# Patient Record
Sex: MALE | Race: WHITE | URBAN - METROPOLITAN AREA
[De-identification: names, ages, dates, MRNs, and addresses within clinical notes are randomized per-mention and may not be internally consistent; named-entity substitution may affect disease eponyms.]

---

## 2018-02-17 LAB
INFLUENZA A ANTIGEN, POC: NEGATIVE
INFLUENZA B ANTIGEN, POC: POSITIVE
RAPID GROUP A STREP (OHS): POSITIVE

## 2019-03-14 LAB — RAPID GROUP A STREP (OHS): NEGATIVE

## 2020-01-30 LAB
ABS NEUT (OLG): 3.7 X10(3)/MCL (ref 2.1–9.2)
BASOPHILS # BLD AUTO: 0.04 X10(3)/MCL (ref 0–0.2)
BASOPHILS NFR BLD AUTO: 0.6 % (ref 0–0.9)
BUN SERPL-MCNC: 13 MG/DL (ref 7–18)
CALCIUM SERPL-MCNC: 9.2 MG/DL (ref 8.5–10.1)
CHLORIDE SERPL-SCNC: 107 MMOL/L (ref 98–107)
CO2 SERPL-SCNC: 25 MMOL/L (ref 21–32)
CREAT SERPL-MCNC: 0.87 MG/DL (ref 0.7–1.3)
CREAT/UREA NIT SERPL: 15
EOSINOPHIL # BLD AUTO: 0.42 X10(3)/MCL (ref 0–0.9)
EOSINOPHIL NFR BLD AUTO: 6.2 % (ref 0–6.5)
ERYTHROCYTE [DISTWIDTH] IN BLOOD BY AUTOMATED COUNT: 12.2 % (ref 11.5–17)
GLUCOSE SERPL-MCNC: 114 MG/DL (ref 74–106)
HCT VFR BLD AUTO: 45 % (ref 42–52)
HGB BLD-MCNC: 15.1 GM/DL (ref 14–18)
IMM GRANULOCYTES # BLD AUTO: 0.03 10*3/UL (ref 0–0.02)
IMM GRANULOCYTES NFR BLD AUTO: 0.4 % (ref 0–0.43)
LYMPHOCYTES # BLD AUTO: 1.88 X10(3)/MCL (ref 0.6–4.6)
LYMPHOCYTES NFR BLD AUTO: 27.8 % (ref 16.2–38.3)
MCH RBC QN AUTO: 29.3 PG (ref 27–31)
MCHC RBC AUTO-ENTMCNC: 33.6 GM/DL (ref 33–36)
MCV RBC AUTO: 87.4 FL (ref 80–94)
MONOCYTES # BLD AUTO: 0.69 X10(3)/MCL (ref 0.1–1.3)
MONOCYTES NFR BLD AUTO: 10.2 % (ref 4.7–11.3)
NEUTROPHILS # BLD AUTO: 3.7 X10(3)/MCL (ref 2.1–9.2)
NEUTROPHILS NFR BLD AUTO: 54.8 % (ref 49.1–73.4)
NRBC BLD AUTO-RTO: 0 % (ref 0–0.2)
PLATELET # BLD AUTO: 239 X10(3)/MCL (ref 130–400)
PMV BLD AUTO: 9.5 FL (ref 7.4–10.4)
POTASSIUM SERPL-SCNC: 4.2 MMOL/L (ref 3.5–5.1)
RBC # BLD AUTO: 5.15 X10(6)/MCL (ref 4.7–6.1)
SODIUM SERPL-SCNC: 140 MMOL/L (ref 136–145)
WBC # SPEC AUTO: 6.8 X10(3)/MCL (ref 4.5–11.5)

## 2020-02-03 ENCOUNTER — HISTORICAL (OUTPATIENT)
Dept: SURGERY | Facility: HOSPITAL | Age: 20
End: 2020-02-03

## 2020-02-18 ENCOUNTER — HISTORICAL (OUTPATIENT)
Dept: ADMINISTRATIVE | Facility: HOSPITAL | Age: 20
End: 2020-02-18

## 2020-03-05 ENCOUNTER — HISTORICAL (OUTPATIENT)
Dept: ADMINISTRATIVE | Facility: HOSPITAL | Age: 20
End: 2020-03-05

## 2020-08-31 ENCOUNTER — HISTORICAL (OUTPATIENT)
Dept: URGENT CARE | Facility: CLINIC | Age: 20
End: 2020-08-31

## 2022-04-09 ENCOUNTER — HISTORICAL (OUTPATIENT)
Dept: ADMINISTRATIVE | Facility: HOSPITAL | Age: 22
End: 2022-04-09

## 2022-04-27 VITALS
SYSTOLIC BLOOD PRESSURE: 126 MMHG | WEIGHT: 189.63 LBS | DIASTOLIC BLOOD PRESSURE: 78 MMHG | OXYGEN SATURATION: 98 % | BODY MASS INDEX: 25.13 KG/M2 | HEIGHT: 73 IN

## 2022-04-30 NOTE — OP NOTE
DATE OF SURGERY:    02/03/2020    SURGEON:  German Crain MD  ASSISTANT:  None    PREOPERATIVE DIAGNOSIS:  Fracture of the middle phalanx of the left small finger.    POSTOPERATIVE DIAGNOSIS:  Fracture of the middle phalanx of the left small finger.    PROCEDURE:  Closed reduction and percutaneous pinning of left small finger fracture.    SURGEON:  German Crain MD    ASSISTANT:  None.    BLOOD LOSS:  None.    INDICATIONS:  The patient is a 19-year-old male who presented to our office.  He had a history of injury to his left finger while playing basketball.  Patient noted deformity, swelling to his left finger.  X-rays were obtained and showed a displaced fracture of his middle phalanx with about 90 degrees of displacement.  I discussed all treatment options with patient, recommended operative fixation, given the significant displacement of his small finger fracture.  The risks, benefits, and alternatives to closed reduction, percutaneous pinning of left small finger fracture were discussed in detail.  All questions were answered.  No guarantees were made.  Despite these risks, he elected to proceed with surgical intervention.    PROCEDURE IN DETAIL:  The patient was seen in the preoperative holding area, was marked and consent for surgery.  The operation was under MAC anesthesia after a block of left upper extremity.  The patient's left arm prepped in normal sterile fashion.  Timeout was performed, verifying correct patient, site and procedure.  All were in agreement.  Using fluoroscopic guidance and direct traction as well as flexion of his finger, we were able to get the fracture anatomically reduced.  A 4.5 K-wire was then passed through his distal phalanx, directly down the shaft, into and across his DIP joint, into his middle phalanx fracture, and then across the fracture.  Fracture alignment was maintained.  The K-wire was then imbedded into the proximal portion of his middle phalanx, not  crossing his PIP joint.  PIP joint was ranged; it was stable.  Fracture alignment was maintained throughout range of motion on AP and lateral views.  Afterwards, the wire was cut, a Jurgan ball was placed.  Following this, it was dressed with Xeroform, as well as a well-padded ulnar gutter splint.  The patient arose from anesthesia without any issue and was transferred recovery room, in stable condition.      POSTOPERATIVE PLAN:  He will be nonweightbearing to left lower extremity.  We will see him back in 2 weeks.  He will discharge home today.        ______________________________  German Crain MD    [facility]/  DD:  02/03/2020  Time:  08:24AM  DT:  02/03/2020  Time:  08:35AM  Job #:  [id number]

## 2022-09-16 ENCOUNTER — HISTORICAL (OUTPATIENT)
Dept: ADMINISTRATIVE | Facility: HOSPITAL | Age: 22
End: 2022-09-16